# Patient Record
Sex: FEMALE | Race: WHITE | NOT HISPANIC OR LATINO | Employment: FULL TIME | URBAN - NONMETROPOLITAN AREA
[De-identification: names, ages, dates, MRNs, and addresses within clinical notes are randomized per-mention and may not be internally consistent; named-entity substitution may affect disease eponyms.]

---

## 2021-01-21 ENCOUNTER — HOSPITAL ENCOUNTER (EMERGENCY)
Facility: HOSPITAL | Age: 56
Discharge: HOME | End: 2021-01-21
Attending: EMERGENCY MEDICINE | Admitting: EMERGENCY MEDICINE
Payer: COMMERCIAL

## 2021-01-21 ENCOUNTER — APPOINTMENT (EMERGENCY)
Dept: CT IMAGING | Facility: HOSPITAL | Age: 56
End: 2021-01-21
Payer: COMMERCIAL

## 2021-01-21 VITALS
SYSTOLIC BLOOD PRESSURE: 124 MMHG | DIASTOLIC BLOOD PRESSURE: 56 MMHG | WEIGHT: 166.67 LBS | HEART RATE: 65 BPM | TEMPERATURE: 97.9 F | RESPIRATION RATE: 18 BRPM | OXYGEN SATURATION: 98 %

## 2021-01-21 DIAGNOSIS — R07.9 CHEST PAIN: Primary | ICD-10-CM

## 2021-01-21 LAB
ALBUMIN SERPL BCP-MCNC: 3.6 G/DL (ref 3.5–5)
ALP SERPL-CCNC: 82 U/L (ref 46–116)
ALT SERPL W P-5'-P-CCNC: 30 U/L (ref 12–78)
ANION GAP SERPL CALCULATED.3IONS-SCNC: 6 MMOL/L (ref 4–13)
APTT PPP: 27 SECONDS (ref 23–37)
AST SERPL W P-5'-P-CCNC: 18 U/L (ref 5–45)
BASOPHILS # BLD AUTO: 0.04 THOUSANDS/ΜL (ref 0–0.1)
BASOPHILS NFR BLD AUTO: 1 % (ref 0–1)
BILIRUB SERPL-MCNC: 0.34 MG/DL (ref 0.2–1)
BUN SERPL-MCNC: 17 MG/DL (ref 5–25)
CALCIUM SERPL-MCNC: 8.5 MG/DL (ref 8.3–10.1)
CHLORIDE SERPL-SCNC: 103 MMOL/L (ref 100–108)
CK SERPL-CCNC: 71 U/L (ref 26–192)
CO2 SERPL-SCNC: 31 MMOL/L (ref 21–32)
CREAT SERPL-MCNC: 0.68 MG/DL (ref 0.6–1.3)
EOSINOPHIL # BLD AUTO: 0.14 THOUSAND/ΜL (ref 0–0.61)
EOSINOPHIL NFR BLD AUTO: 2 % (ref 0–6)
ERYTHROCYTE [DISTWIDTH] IN BLOOD BY AUTOMATED COUNT: 13 % (ref 11.6–15.1)
GFR SERPL CREATININE-BSD FRML MDRD: 98 ML/MIN/1.73SQ M
GLUCOSE SERPL-MCNC: 99 MG/DL (ref 65–140)
HCT VFR BLD AUTO: 40.9 % (ref 34.8–46.1)
HGB BLD-MCNC: 13.7 G/DL (ref 11.5–15.4)
IMM GRANULOCYTES # BLD AUTO: 0.02 THOUSAND/UL (ref 0–0.2)
IMM GRANULOCYTES NFR BLD AUTO: 0 % (ref 0–2)
INR PPP: 0.96 (ref 0.84–1.19)
LYMPHOCYTES # BLD AUTO: 2.41 THOUSANDS/ΜL (ref 0.6–4.47)
LYMPHOCYTES NFR BLD AUTO: 35 % (ref 14–44)
MCH RBC QN AUTO: 31.4 PG (ref 26.8–34.3)
MCHC RBC AUTO-ENTMCNC: 33.5 G/DL (ref 31.4–37.4)
MCV RBC AUTO: 94 FL (ref 82–98)
MONOCYTES # BLD AUTO: 0.4 THOUSAND/ΜL (ref 0.17–1.22)
MONOCYTES NFR BLD AUTO: 6 % (ref 4–12)
NEUTROPHILS # BLD AUTO: 3.95 THOUSANDS/ΜL (ref 1.85–7.62)
NEUTS SEG NFR BLD AUTO: 56 % (ref 43–75)
NRBC BLD AUTO-RTO: 0 /100 WBCS
PLATELET # BLD AUTO: 332 THOUSANDS/UL (ref 149–390)
PMV BLD AUTO: 9.6 FL (ref 8.9–12.7)
POTASSIUM SERPL-SCNC: 3.6 MMOL/L (ref 3.5–5.3)
PROT SERPL-MCNC: 7 G/DL (ref 6.4–8.2)
PROTHROMBIN TIME: 12.6 SECONDS (ref 11.6–14.5)
RBC # BLD AUTO: 4.37 MILLION/UL (ref 3.81–5.12)
SODIUM SERPL-SCNC: 140 MMOL/L (ref 136–145)
TROPONIN I SERPL-MCNC: <0.02 NG/ML
WBC # BLD AUTO: 6.96 THOUSAND/UL (ref 4.31–10.16)

## 2021-01-21 PROCEDURE — 85610 PROTHROMBIN TIME: CPT | Performed by: PHYSICIAN ASSISTANT

## 2021-01-21 PROCEDURE — 93005 ELECTROCARDIOGRAM TRACING: CPT

## 2021-01-21 PROCEDURE — 85730 THROMBOPLASTIN TIME PARTIAL: CPT | Performed by: PHYSICIAN ASSISTANT

## 2021-01-21 PROCEDURE — 36415 COLL VENOUS BLD VENIPUNCTURE: CPT | Performed by: PHYSICIAN ASSISTANT

## 2021-01-21 PROCEDURE — G1004 CDSM NDSC: HCPCS

## 2021-01-21 PROCEDURE — 85025 COMPLETE CBC W/AUTO DIFF WBC: CPT | Performed by: PHYSICIAN ASSISTANT

## 2021-01-21 PROCEDURE — 99285 EMERGENCY DEPT VISIT HI MDM: CPT

## 2021-01-21 PROCEDURE — 84484 ASSAY OF TROPONIN QUANT: CPT | Performed by: PHYSICIAN ASSISTANT

## 2021-01-21 PROCEDURE — 99284 EMERGENCY DEPT VISIT MOD MDM: CPT | Performed by: EMERGENCY MEDICINE

## 2021-01-21 PROCEDURE — 82550 ASSAY OF CK (CPK): CPT | Performed by: PHYSICIAN ASSISTANT

## 2021-01-21 PROCEDURE — 80053 COMPREHEN METABOLIC PANEL: CPT | Performed by: PHYSICIAN ASSISTANT

## 2021-01-21 PROCEDURE — 71275 CT ANGIOGRAPHY CHEST: CPT

## 2021-01-21 PROCEDURE — 74174 CTA ABD&PLVS W/CONTRAST: CPT

## 2021-01-21 RX ADMIN — IOHEXOL 100 ML: 350 INJECTION, SOLUTION INTRAVENOUS at 16:15

## 2021-01-21 NOTE — DISCHARGE INSTRUCTIONS
If you have any new or worsening symptoms please return immediately to the emergency department  It is important that you follow-up with cardiology, a referral has been placed for you and the number for Dr Colton Shay is placed on these instructions

## 2021-01-21 NOTE — ED PROVIDER NOTES
History  Chief Complaint   Patient presents with    Chest Pain     pt states chest pain started last night that radiates to her back, +diaphoresis, denies SOB, dizziness, N/V     58-year-old female presents the emergency for evaluation  Patient states over last 2 weeks she has had 3 episodes of squeezing chest pain with radiation directly into her back  She reports chest pain as "feeling like something will pop " States last night this was more intense and lasted longer than previous episodes  Reports today she has no chest pain however continues with back discomfort  Patient denies any difficulty breathing or shortness of breath however states during these episodes she does become sweaty  She denies any nausea or vomiting she denies any abdominal pain  Patient denies dizziness or lightheadedness  She reports she did take Tums for this as she thought it was heart burn with no improvement of symptoms  Reports she does not have a history of heartburn  Patient reports significant family history of father and brothers with MI in their 36  No personal cardiac history  Patient is a nonsmoker  Patient denies any chest pain today or at this time  She states she continues with "residual back pain from last night's episode "  Patient refusing anything for pain at this time  Patient states symptoms lasted for 30 minutes last night        History provided by:  Patient  Chest Pain  Chest pain location: Midsternal   Pain quality: tightness    Pain radiates to:  Upper back  Pain radiates to the back: yes    Pain severity:  No pain  Onset quality:  Sudden  Duration:  30 minutes  Timing:  Intermittent  Chronicity:  Recurrent  Ineffective treatments:  Antacids  Associated symptoms: back pain and diaphoresis    Associated symptoms: no abdominal pain, no AICD problem, no altered mental status, no anorexia, no anxiety, no claudication, no cough, no dizziness, no dysphagia, no fatigue, no fever, no headache, no heartburn, no lower extremity edema, no nausea, no near-syncope, no numbness, no orthopnea, no palpitations, no PND, no shortness of breath, no syncope, not vomiting and no weakness    Risk factors: no aortic disease, no birth control, no coronary artery disease, no diabetes mellitus, no Kalani-Danlos syndrome, no high cholesterol, no hypertension, no immobilization, not male, no Marfan's syndrome, not obese, not pregnant, no prior DVT/PE, no smoking and no surgery        None       History reviewed  No pertinent past medical history  Past Surgical History:   Procedure Laterality Date    FRACTURE SURGERY      HYSTERECTOMY         History reviewed  No pertinent family history  I have reviewed and agree with the history as documented  E-Cigarette/Vaping    E-Cigarette Use Never User      E-Cigarette/Vaping Substances     Social History     Tobacco Use    Smoking status: Never Smoker    Smokeless tobacco: Never Used   Substance Use Topics    Alcohol use: Yes     Frequency: Monthly or less    Drug use: Never       Review of Systems   Constitutional: Positive for diaphoresis  Negative for appetite change, chills, fatigue and fever  HENT: Negative  Negative for trouble swallowing  Respiratory: Negative  Negative for cough, choking, chest tightness, shortness of breath, wheezing and stridor  Cardiovascular: Positive for chest pain  Negative for palpitations, orthopnea, claudication, leg swelling, syncope, PND and near-syncope  Gastrointestinal: Negative  Negative for abdominal distention, abdominal pain, anorexia, constipation, diarrhea, heartburn, nausea, rectal pain and vomiting  Genitourinary: Negative  Musculoskeletal: Positive for back pain  Negative for arthralgias, gait problem, joint swelling, myalgias, neck pain and neck stiffness  Skin: Negative  Negative for color change, pallor, rash and wound  Neurological: Negative  Negative for dizziness, weakness, numbness and headaches     All other systems reviewed and are negative  Physical Exam  Physical Exam  Vitals signs and nursing note reviewed  Constitutional:       General: She is not in acute distress  Appearance: She is well-developed and normal weight  She is not ill-appearing, toxic-appearing or diaphoretic  HENT:      Head: Normocephalic and atraumatic  Eyes:      Pupils: Pupils are equal, round, and reactive to light  Neck:      Musculoskeletal: Normal range of motion and neck supple  Cardiovascular:      Rate and Rhythm: Normal rate and regular rhythm  Pulses:           Radial pulses are 2+ on the right side and 2+ on the left side  Dorsalis pedis pulses are 2+ on the right side and 2+ on the left side  Posterior tibial pulses are 2+ on the right side and 2+ on the left side  Heart sounds: Normal heart sounds  No murmur  Pulmonary:      Effort: Pulmonary effort is normal  No tachypnea or accessory muscle usage  Breath sounds: Normal breath sounds  No decreased breath sounds, wheezing, rhonchi or rales  Chest:      Chest wall: No mass, deformity or tenderness  Abdominal:      General: Bowel sounds are normal       Palpations: Abdomen is soft  There is no mass  Tenderness: There is no abdominal tenderness  Musculoskeletal: Normal range of motion  Right lower leg: She exhibits no tenderness  No edema  Left lower leg: She exhibits no tenderness  No edema  Lymphadenopathy:      Cervical: No cervical adenopathy  Skin:     General: Skin is warm and dry  Capillary Refill: Capillary refill takes less than 2 seconds  Coloration: Skin is not pale  Findings: No ecchymosis or erythema  Nails: There is no clubbing  Neurological:      General: No focal deficit present  Mental Status: She is alert and oriented to person, place, and time     Psychiatric:         Mood and Affect: Mood normal          Behavior: Behavior normal          Vital Signs  ED Triage Vitals [01/21/21 1457]   Temperature Pulse Respirations Blood Pressure SpO2   97 9 °F (36 6 °C) 70 20 142/65 97 %      Temp Source Heart Rate Source Patient Position - Orthostatic VS BP Location FiO2 (%)   Temporal Monitor Lying Right arm --      Pain Score       6           Vitals:    01/21/21 1515 01/21/21 1545 01/21/21 1600 01/21/21 1645   BP: 124/57 113/57 124/56    Pulse: 67 66 64 65   Patient Position - Orthostatic VS:   Lying          Visual Acuity      ED Medications  Medications   iohexol (OMNIPAQUE) 350 MG/ML injection (SINGLE-DOSE) 100 mL (100 mL Intravenous Given 1/21/21 1615)       Diagnostic Studies  Results Reviewed     Procedure Component Value Units Date/Time    Troponin I [[id number]]  (Normal) Collected: 01/21/21 1530    Lab Status: Final result Specimen: Blood from Arm, Left Updated: 01/21/21 1559     Troponin I <0 02 ng/mL     Comprehensive metabolic panel [[id number]] Collected: 01/21/21 1530    Lab Status: Final result Specimen: Blood from Arm, Left Updated: 01/21/21 1554     Sodium 140 mmol/L      Potassium 3 6 mmol/L      Chloride 103 mmol/L      CO2 31 mmol/L      ANION GAP 6 mmol/L      BUN 17 mg/dL      Creatinine 0 68 mg/dL      Glucose 99 mg/dL      Calcium 8 5 mg/dL      AST 18 U/L      ALT 30 U/L      Alkaline Phosphatase 82 U/L      Total Protein 7 0 g/dL      Albumin 3 6 g/dL      Total Bilirubin 0 34 mg/dL      eGFR 98 ml/min/1 73sq m     Narrative:      Hiram guidelines for Chronic Kidney Disease (CKD):     Stage 1 with normal or high GFR (GFR > 90 mL/min/1 73 square meters)    Stage 2 Mild CKD (GFR = 60-89 mL/min/1 73 square meters)    Stage 3A Moderate CKD (GFR = 45-59 mL/min/1 73 square meters)    Stage 3B Moderate CKD (GFR = 30-44 mL/min/1 73 square meters)    Stage 4 Severe CKD (GFR = 15-29 mL/min/1 73 square meters)    Stage 5 End Stage CKD (GFR <15 mL/min/1 73 square meters)  Note: GFR calculation is accurate only with a steady state creatinine    CK Total with Reflex CKMB [[id number]]  (Normal) Collected: 01/21/21 1530    Lab Status: Final result Specimen: Blood from Arm, Left Updated: 01/21/21 1554     Total CK 71 U/L     Protime-INR [[id number]]  (Normal) Collected: 01/21/21 1530    Lab Status: Final result Specimen: Blood from Arm, Left Updated: 01/21/21 1549     Protime 12 6 seconds      INR 0 96    APTT [[id number]]  (Normal) Collected: 01/21/21 1530    Lab Status: Final result Specimen: Blood from Arm, Left Updated: 01/21/21 1549     PTT 27 seconds     CBC and differential [[id number]] Collected: 01/21/21 1530    Lab Status: Final result Specimen: Blood from Arm, Left Updated: 01/21/21 1537     WBC 6 96 Thousand/uL      RBC 4 37 Million/uL      Hemoglobin 13 7 g/dL      Hematocrit 40 9 %      MCV 94 fL      MCH 31 4 pg      MCHC 33 5 g/dL      RDW 13 0 %      MPV 9 6 fL      Platelets 238 Thousands/uL      nRBC 0 /100 WBCs      Neutrophils Relative 56 %      Immat GRANS % 0 %      Lymphocytes Relative 35 %      Monocytes Relative 6 %      Eosinophils Relative 2 %      Basophils Relative 1 %      Neutrophils Absolute 3 95 Thousands/µL      Immature Grans Absolute 0 02 Thousand/uL      Lymphocytes Absolute 2 41 Thousands/µL      Monocytes Absolute 0 40 Thousand/µL      Eosinophils Absolute 0 14 Thousand/µL      Basophils Absolute 0 04 Thousands/µL                  CTA dissection protocol chest/abdomen/pelvis   Final Result by Ramón Avalos MD (01/21 1634)      No acute pathology  No aortic aneurysm or dissection                 Workstation performed: [id number]                    Procedures  ECG 12 Lead Documentation Only    Date/Time: 1/21/2021 2:57 PM  Performed by: Randy Moore PA-C  Authorized by: Randy Moore PA-C     Indications / Diagnosis:  Chest pain  Patient location:  ED  Interpretation:     Interpretation: non-specific    Rate:     ECG rate:  71    ECG rate assessment: normal    Rhythm:     Rhythm: sinus rhythm    Ectopy: Ectopy: none    QRS:     QRS axis:  Normal    QRS intervals:  Normal  Conduction:     Conduction: normal    ST segments:     ST segments:  Normal  T waves:     T waves: normal               ED Course             HEART Risk Score      Most Recent Value   Heart Score Risk Calculator   History  1 Filed at: 01/21/2021 1611   ECG  0 Filed at: 01/21/2021 1611   Age  1 Filed at: 01/21/2021 1611   Risk Factors  1 Filed at: 01/21/2021 1611   Troponin  0 Filed at: 01/21/2021 1611   HEART Score  3 Filed at: 01/21/2021 1611                [facility]  Number of Diagnoses or Management Options  Chest pain: new and requires workup  Diagnosis management comments: 76-year-old female presented to the emergency department due to episodes of squeezing chest pain with radiation to the back that have been intermittent over last 2 weeks  Last episode last night lasting about 30 minutes  No chest pain currently  Continues with residual back pain " Vitals and medical record reviewed  Physical exam relatively unremarkable  Heart regular rate rhythm  Lungs clear auscultation  No reproducible chest or back pain  EKG was nonischemic  Troponin negative  Heart score 3  Laboratory findings unremarkable  CT a negative for acute pathology or dissection  I discussed results and findings with the patient  We discussed symptomatic treatment and symptoms that require prompt return to the ED for further evaluation patient verbalized understanding  Will have patient follow-up with Cardiology, referral was placed         Amount and/or Complexity of Data Reviewed  Clinical lab tests: ordered and reviewed  Tests in the radiology section of CPT®: ordered and reviewed  Review and summarize past medical records: yes  Independent visualization of images, tracings, or specimens: yes        Disposition  Final diagnoses:   Chest pain     Time reflects when diagnosis was documented in both MDM as applicable and the Disposition within this note     Time User Action Codes Description Comment    2021  4:48 PM Sindhu Murphy Add [R07 9] Chest pain       ED Disposition     ED Disposition Condition Date/Time Comment    Discharge Stable Thu 2021  4:48 PM Everlloyd Oats discharge to home/self care  Follow-up Information     Follow up With Specialties Details Why Contact Info    Yoni Evangelista, DO Internal Medicine In 1 week As needed, If symptoms worsen [address]      Rocco Cough, DO Cardiology In 3 days For continued evaluation [address]  [phone]            There are no discharge medications for this patient          PDMP Review     None          ED Provider  Electronically Signed by           Krysta Crowder PA-C  21 1015 Willian Martin MD  21 1004

## 2021-01-21 NOTE — ED NOTES
Pt in no acute distress  Ambulates with a steady gait   Verbalizes understanding of discharge instructions       Malia Lindsey RN  01/21/21 9756

## 2021-01-21 NOTE — ED ATTENDING ATTESTATION
1/21/2021  I, Cedric Bunch MD, saw and evaluated the patient  I have discussed the patient with the resident/non-physician practitioner and agree with the resident's/non-physician practitioner's findings, Plan of Care, and MDM as documented in the resident's/non-physician practitioner's note, except where noted  All available labs and Radiology studies were reviewed  I was present for key portions of any procedure(s) performed by the resident/non-physician practitioner and I was immediately available to provide assistance  At this point I agree with the current assessment done in the Emergency Department        ED Course         Critical Care Time  Procedures

## 2021-01-25 LAB
ATRIAL RATE: 71 BPM
P AXIS: 71 DEGREES
PR INTERVAL: 136 MS
QRS AXIS: 18 DEGREES
QRSD INTERVAL: 74 MS
QT INTERVAL: 400 MS
QTC INTERVAL: 434 MS
T WAVE AXIS: 65 DEGREES
VENTRICULAR RATE: 71 BPM

## 2021-08-12 ENCOUNTER — CONSULT (OUTPATIENT)
Dept: CARDIOLOGY CLINIC | Facility: CLINIC | Age: 56
End: 2021-08-12
Payer: COMMERCIAL

## 2021-08-12 VITALS
DIASTOLIC BLOOD PRESSURE: 68 MMHG | HEIGHT: 66 IN | WEIGHT: 169.8 LBS | OXYGEN SATURATION: 98 % | BODY MASS INDEX: 27.29 KG/M2 | SYSTOLIC BLOOD PRESSURE: 128 MMHG | HEART RATE: 75 BPM

## 2021-08-12 DIAGNOSIS — S99.922S INJURY OF LEFT FOOT, SEQUELA: ICD-10-CM

## 2021-08-12 DIAGNOSIS — E78.2 MIXED HYPERLIPIDEMIA: ICD-10-CM

## 2021-08-12 DIAGNOSIS — R07.2 PRECORDIAL PAIN: Primary | ICD-10-CM

## 2021-08-12 DIAGNOSIS — Z82.49 FAMILY HISTORY OF CORONARY ARTERY DISEASE: ICD-10-CM

## 2021-08-12 PROCEDURE — 99204 OFFICE O/P NEW MOD 45 MIN: CPT | Performed by: INTERNAL MEDICINE

## 2021-08-12 RX ORDER — MELATONIN
1000 DAILY
COMMUNITY

## 2021-08-12 RX ORDER — ASPIRIN 81 MG/1
81 TABLET ORAL DAILY
COMMUNITY

## 2021-08-12 NOTE — PATIENT INSTRUCTIONS
Continue to monitor for recurrent symptoms  Echocardiogram to assess heart structure and function  Will plan exercise stress test once able to walk on the treadmill  Will discuss repeat trial of statin therapy at follow up

## 2021-08-12 NOTE — PROGRESS NOTES
Cardiology Office Visit    Da Cabral  [id number]  1965    [facility]  [address] [place] [id number] [phone]      Dear Kenney Rodriguez DO,    I had the pleasure of seeing your patient at our [address] [facility] [place] office today 8/12/2021  As you know she is a pleasant  female with a medical history as described below  Reason for office visit: Evaluation of chest pain  1  Precordial pain  Assessment & Plan:  3 episodes of precordial pain with the 3rd episode prompting ER evaluation 1/21/2021  Chest pain/heaviness with radiation into back with residual back pain/soreness for a few days after episode  ECG in ER was unremarkable as were cardiac enzymes  Patient has noted 1-2 episodes of recurrent symptoms that have been much shorter in duration and less severe  She was able to run a 1/2 marathon 5/2021 without difficulty (until she injured foot)  Recommend:   Echocardiogram to assess heart structure and function  Exercise stress test to rule out underlying ischemia given family history  She was started on pantoprazole by PCP as a trial    Further recommendations after review of cardiac testing  Orders:  -     Stress test only, exercise; Future; Expected date: 08/12/2021  -     Echo complete; Future; Expected date: 08/12/2021    2  Mixed hyperlipidemia  Assessment & Plan:  Patient was on atorvastatin which she tells me she did not tolerate  I do not have most recent lipid panel to review but will request    Goal LDL < 100 given family history  3  Family history of coronary artery disease  Assessment & Plan:  Family history of premature coronary artery disease in 3 brothers with CAD /Mis in their 42's  Would recommend aggressive risk factor modification  Goal LDL < 100  Echocardiogram and eventual exercise stress test (once able to walk on treadmill) as ordered     Updated lipid panel if not done recently for risk assessment  Can consider CAC score pending results of above testing for further risk stratification  Orders:  -     Stress test only, exercise; Future; Expected date: 08/12/2021  -     Echo complete; Future; Expected date: 08/12/2021    4  Injury of left foot, sequela  Assessment & Plan: Torn tendon per report which occurred while running a 1/2 marathon  Will have to delay exercise stress test until she can walk on the treadmill  HPI     Patient has a past medical history of hyperlipidemia, family history of coronary artery disease and premenopausal menorrhagia  Patient has a strong family history of CAD with 3 brothers with CAD/MI in their 42's  Patient was seen by her PCP (Dr Mervin Patel) 3/29/2021 after she was evaluated in the emergency room at [address] [place] [address] for chest pain 1/21/2021  She had noted 3 episodes of chest pain: 1/11/2021, 1/16/2021 and again 1/20/2021 with the last episode being the worst and prompting ER evaluation  She reported that her pain started in her chest and penetrated into her back with diaphoresis  She ruled out for MI in ER and outpatient cardiology evaluation was recommended  She was started on pantoprazole 20 mg daily by Dr Mervin Patel for probable reflux  8/12/2021:  Patient presents to the office today for evaluation of chest pain  She recounts the episodes outlined above  She tells me that after her ER evaluation on 01/21/2021 she had back pain and was sore for days  She tells me that she previously was on a statin did not tolerate this  She ran a half marathon 5/2021 and tore a tendon her foot  She has gained weight since her foot injury  She denies any prior cardiac testing  She does report 1-2 recurrent episodes of chest pain since 1/2021 but states these were shorter and not as severe  She denies any shortness of breath  No dizziness  Occasional positional lightheadedness       Patient Active Problem List   Diagnosis    Mixed hyperlipidemia    Family history of coronary artery disease    Precordial pain    Injury of left foot     Past Medical History:   Diagnosis Date    Family history of coronary artery disease     Mixed hyperlipidemia      Social History     Socioeconomic History    Marital status:      Spouse name: Not on file    Number of children: Not on file    Years of education: Not on file    Highest education level: Not on file   Occupational History    Not on file   Tobacco Use    Smoking status: Never Smoker    Smokeless tobacco: Never Used   Vaping Use    Vaping Use: Never used   Substance and Sexual Activity    Alcohol use:  Yes    Drug use: Never    Sexual activity: Not on file     Comment: Defer   Other Topics Concern    Not on file   Social History Narrative    Not on file     Social Determinants of Health     Financial Resource Strain: Not on file   Food Insecurity: Not on file   Transportation Needs: Not on file   Physical Activity: Not on file   Stress: Not on file   Social Connections: Not on file   Intimate Partner Violence: Not on file   Housing Stability: Not on file      Family History   Problem Relation Age of Onset    Heart failure Mother     Heart attack Father     Peripheral vascular disease Father     Coronary artery disease Father         MI in 42's    Heart murmur Sister     Coronary artery disease Brother         MI at 37 MI 55 CVA    Atrial fibrillation Brother     Heart attack Brother         MI at 39 CABG    Coronary artery disease Brother      Past Surgical History:   Procedure Laterality Date    CYST REMOVAL      FRACTURE SURGERY      HYSTERECTOMY      WISDOM TOOTH EXTRACTION         Current Outpatient Medications:     aspirin (ECOTRIN LOW STRENGTH) 81 mg EC tablet, Take 81 mg by mouth daily, Disp: , Rfl:     Boswellia-Glucosamine-Vit D (OSTEO BI-FLEX ONE PER DAY PO), Take by mouth, Disp: , Rfl:     cholecalciferol (VITAMIN D3) 1,000 units tablet, Take 1,000 Units by mouth daily, Disp: , Rfl:     KRILL OIL PO, Take by mouth, Disp: , Rfl:   Allergies   Allergen Reactions    Anesthesia S-I-40 [Propofol] Swelling       Cardiac Test Results:      ECG 1/21/2021: Normal sinus rhythm  Normal ECG  Review of Systems:    Review of Systems   Constitutional: Positive for fatigue  Negative for activity change and appetite change  HENT: Negative for congestion, hearing loss, tinnitus and trouble swallowing  Eyes: Negative for visual disturbance  Respiratory: Positive for chest tightness ( heaviness)  Negative for cough, shortness of breath and wheezing  Cardiovascular: Positive for chest pain  Negative for palpitations and leg swelling  Gastrointestinal: Negative for abdominal distention, abdominal pain, nausea and vomiting  Genitourinary: Negative for difficulty urinating  Musculoskeletal: Negative for arthralgias  Skin: Negative for rash  Neurological: Positive for light-headedness (Positional)  Negative for dizziness and syncope  Hematological: Does not bruise/bleed easily  Psychiatric/Behavioral: Negative for confusion  The patient is not nervous/anxious  All other systems reviewed and are negative  Vitals:    08/12/21 1447 08/12/21 1516   BP: 132/70 128/68   BP Location:  Left arm   Patient Position:  Sitting   Pulse: 75    SpO2: 98%    Weight: 77 kg (169 lb 12 8 oz)    Height: 5' 6" (1 676 m)      Vitals:    08/12/21 1447   Weight: 77 kg (169 lb 12 8 oz)     Height: 5' 6" (167 6 cm)     Physical Exam  Vitals reviewed  Constitutional:       Appearance: She is well-developed  HENT:      Head: Normocephalic and atraumatic  Eyes:      Conjunctiva/sclera: Conjunctivae normal       Pupils: Pupils are equal, round, and reactive to light  Neck:      Vascular: No JVD  Cardiovascular:      Rate and Rhythm: Normal rate and regular rhythm  Heart sounds: Normal heart sounds  No murmur heard  No friction rub  No gallop     Pulmonary: Effort: Pulmonary effort is normal       Breath sounds: Normal breath sounds  Abdominal:      General: Bowel sounds are normal       Palpations: Abdomen is soft  Musculoskeletal:      Cervical back: Normal range of motion  Skin:     General: Skin is warm and dry  Neurological:      Mental Status: She is alert and oriented to person, place, and time     Psychiatric:         Behavior: Behavior normal

## 2021-09-16 ENCOUNTER — HOSPITAL ENCOUNTER (OUTPATIENT)
Dept: MRI IMAGING | Facility: HOSPITAL | Age: 56
Discharge: HOME | End: 2021-09-16
Payer: COMMERCIAL

## 2021-09-16 DIAGNOSIS — M76.822 POSTERIOR TIBIAL TENDINITIS OF LEFT LEG: ICD-10-CM

## 2021-09-16 PROCEDURE — G1004 CDSM NDSC: HCPCS

## 2021-09-16 PROCEDURE — 73721 MRI JNT OF LWR EXTRE W/O DYE: CPT

## 2022-05-24 NOTE — ASSESSMENT & PLAN NOTE
Patient was on atorvastatin which she tells me she did not tolerate  I do not have most recent lipid panel to review but will request    Goal LDL < 100 given family history

## 2022-05-24 NOTE — ASSESSMENT & PLAN NOTE
3 episodes of precordial pain with the 3rd episode prompting ER evaluation 1/21/2021  Chest pain/heaviness with radiation into back with residual back pain/soreness for a few days after episode  ECG in ER was unremarkable as were cardiac enzymes  Patient has noted 1-2 episodes of recurrent symptoms that have been much shorter in duration and less severe  She was able to run a 1/2 marathon 5/2021 without difficulty (until she injured foot)  Recommend:   Echocardiogram to assess heart structure and function  Exercise stress test to rule out underlying ischemia given family history  She was started on pantoprazole by PCP as a trial    Further recommendations after review of cardiac testing

## 2022-05-24 NOTE — ASSESSMENT & PLAN NOTE
Family history of premature coronary artery disease in 3 brothers with CAD /Mis in their 42's  Would recommend aggressive risk factor modification  Goal LDL < 100  Echocardiogram and eventual exercise stress test (once able to walk on treadmill) as ordered  Updated lipid panel if not done recently for risk assessment  Can consider CAC score pending results of above testing for further risk stratification

## 2022-05-24 NOTE — ASSESSMENT & PLAN NOTE
Torn tendon per report which occurred while running a 1/2 marathon  Will have to delay exercise stress test until she can walk on the treadmill